# Patient Record
Sex: FEMALE | Race: WHITE | HISPANIC OR LATINO | Employment: PART TIME | URBAN - METROPOLITAN AREA
[De-identification: names, ages, dates, MRNs, and addresses within clinical notes are randomized per-mention and may not be internally consistent; named-entity substitution may affect disease eponyms.]

---

## 2019-03-29 ENCOUNTER — OFFICE VISIT (OUTPATIENT)
Dept: URGENT CARE | Facility: CLINIC | Age: 54
End: 2019-03-29

## 2019-03-29 VITALS
TEMPERATURE: 99 F | DIASTOLIC BLOOD PRESSURE: 90 MMHG | BODY MASS INDEX: 37.28 KG/M2 | RESPIRATION RATE: 20 BRPM | OXYGEN SATURATION: 98 % | SYSTOLIC BLOOD PRESSURE: 135 MMHG | HEART RATE: 98 BPM | WEIGHT: 232 LBS | HEIGHT: 66 IN

## 2019-03-29 DIAGNOSIS — S06.0X0A CONCUSSION WITHOUT LOSS OF CONSCIOUSNESS, INITIAL ENCOUNTER: Primary | ICD-10-CM
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
## 2019-03-29 DIAGNOSIS — S00.91XA ABRASION OF HEAD, INITIAL ENCOUNTER: ICD-10-CM

## 2019-03-29 DIAGNOSIS — S09.90XA TRAUMATIC INJURY OF HEAD, INITIAL ENCOUNTER: ICD-10-CM

## 2019-03-29 DIAGNOSIS — S00.93XA CONTUSION OF HEAD, UNSPECIFIED PART OF HEAD, INITIAL ENCOUNTER: ICD-10-CM

## 2019-03-29 DIAGNOSIS — G44.319 ACUTE POST-TRAUMATIC HEADACHE, NOT INTRACTABLE: ICD-10-CM

## 2019-03-29 PROCEDURE — 99204 OFFICE O/P NEW MOD 45 MIN: CPT | Mod: S$GLB,,, | Performed by: NURSE PRACTITIONER

## 2019-03-29 PROCEDURE — 99204 PR OFFICE/OUTPT VISIT, NEW, LEVL IV, 45-59 MIN: ICD-10-PCS | Mod: S$GLB,,, | Performed by: NURSE PRACTITIONER

## 2019-03-29 RX ORDER — ACETAMINOPHEN 500 MG
1000 TABLET ORAL EVERY 6 HOURS PRN
COMMUNITY
Start: 2019-03-29

## 2019-03-29 RX ORDER — LOSARTAN POTASSIUM 100 MG/1
TABLET ORAL
Refills: 0 | COMMUNITY
Start: 2019-03-27

## 2019-03-29 RX ORDER — LOSARTAN POTASSIUM 100 MG/1
TABLET ORAL
COMMUNITY
Start: 2018-07-27

## 2019-03-29 RX ORDER — GLYBURIDE 5 MG/1
TABLET ORAL
Refills: 1 | COMMUNITY
Start: 2019-03-13

## 2019-03-29 RX ORDER — ERGOCALCIFEROL 1.25 MG/1
CAPSULE ORAL
Refills: 2 | COMMUNITY
Start: 2019-02-25

## 2019-03-29 RX ORDER — GLYBURIDE 5 MG/1
TABLET ORAL
COMMUNITY
Start: 2018-07-27

## 2019-03-29 RX ORDER — METFORMIN HYDROCHLORIDE 1000 MG/1
TABLET ORAL
Refills: 0 | COMMUNITY
Start: 2019-02-04

## 2019-03-29 RX ORDER — METFORMIN HYDROCHLORIDE 1000 MG/1
1000 TABLET ORAL
COMMUNITY
Start: 2012-12-09
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
## 2019-03-29 NOTE — PATIENT INSTRUCTIONS
"  Concussion    A concussion can be caused by a direct blow to the head, neck, face, or somewhere else on the body with the force being transmitted to the head. This may cause you to lose consciousness - be "knocked out" - but not always. Depending on the severity of the blow, it will take from a few hours up to a few days to get better. Sometimes symptoms may last a few months or longer. This is called post-concussion syndrome.  At first, you may have a headache, nausea, vomiting, or dizziness. You may also have problems concentrating or remembering things. This is normal.  Symptoms should get better as the hours and days go by. Symptoms that get worse could be a sign of a more serious injury. This might be a bruise or bleeding in the brain. Thats why its important to watch for the warning signs listed below.  Home care  If your injury is mild and there are no serious signs or symptoms, your healthcare provider may recommend that you be monitored at home. If there is evidence that the injury is more serious, you will be monitored in the hospital. Follow these tips to help care for yourself at home:  · After a concussion, your healthcare provider may recommend that a family member or friend monitor you for 12 to 24 hours. They may be told to wake you every few hours during sleep to check for the signs below.  · If your face or scalp swells, apply an ice pack for 20 minutes every 1 to 2 hours. Do this until the swelling starts to go down. You can make an ice pack by putting ice cubes in a plastic bag and wrapping the bag in a towel.  · You may use acetaminophen to control pain, unless another pain medicine was prescribed. Do not use aspirin or ibuprofen after a head injury. If you have chronic liver or kidney disease, talk with your doctor before using these medicines. Also talk with your doctor if you ever had a stomach ulcer or gastrointestinal bleeding.  · For the next 24 hours:  ¨ Dont drink alcohol or take " sedatives or medicines that make you sleepy.  ¨ Dont drive or operate machinery.  ¨ Avoid doing anything strenuous. Dont lift or strain.  · Dont return to sports or any activity that could cause you to hit your head until all symptoms are gone and you have been cleared by your doctor. A second head injury before fully recovering from the first one can lead to serious brain injury.  · Avoid doing activities that require a lot of concentration or a lot of attention. This will allow your brain to rest and heal quicker.  Follow-up care  Follow up with your doctor in 1 week, or as directed.  Note: A radiologist will review any X-rays or CT scans that were taken. You will be told of any new findings that may affect your care.  When to seek medical advice  Call your healthcare provider right away if any of these occur:  · Repeated vomiting  · Headache or dizziness that is severe or gets worse  · Loss of consciousness  · Unusual drowsiness, or unable to wake up as usual  · Weakness or decreased ability to walk or move any limb  · Confusion, agitation, or change in behavior or speech, or memory loss  · Blurred vision  · Convulsion (seizure)  · Swelling on the scalp or face that gets worse  · Changes in pupil size (the black part of the eye)  · Redness, warmth, or pus from the swollen area  · Fluid draining from or bleeding from the nose or ears     Date Last Reviewed: 8/14/2015  © 3855-7868 [employer]. [address] [place] [address]. All rights reserved. This information is not intended as a substitute for professional medical care. Always follow your healthcare professional's instructions.

## 2019-03-29 NOTE — LETTER
[facility] [place]  [address], [address]  [place] [address]  Phone: [phone]  Fax: [phone]  [facility]: [id number]    Pt Name: Andrew Warren  Injury Date: 03/29/2019   Employee ID: [id number] Date of First Treatment: 03/29/2019   Company: [employer]      Appointment Time: Arrived: 1:32 PM   Provider: Leighann Yeager NP Time Out:  3:40 PM     Office Treatment:   1. Concussion without loss of consciousness, initial encounter    2. Traumatic injury of head, initial encounter    3. Acute post-traumatic headache, not intractable    4. Work related injury    5. Abrasion of head, initial encounter    6. Contusion of head, unspecified part of head, initial encounter      Medications Ordered This Encounter   Medications    acetaminophen (TYLENOL EXTRA STRENGTH) 500 MG tablet      Patient Instructions: (ice to head 10 - 15 minutes several times a day.)    Restrictions: Disabled until next office visit     Return Appointment: 4/1/2019 at 4:30 PM

## 2019-04-01 ENCOUNTER — OFFICE VISIT (OUTPATIENT)
Dept: URGENT CARE | Facility: CLINIC | Age: 54
End: 2019-04-01

## 2019-04-01 DIAGNOSIS — G44.319 ACUTE POST-TRAUMATIC HEADACHE, NOT INTRACTABLE: ICD-10-CM

## 2019-04-01 DIAGNOSIS — S06.0X0D CONCUSSION WITHOUT LOSS OF CONSCIOUSNESS, SUBSEQUENT ENCOUNTER: Primary | ICD-10-CM

## 2019-04-01 DIAGNOSIS — S09.90XD TRAUMATIC INJURY OF HEAD, SUBSEQUENT ENCOUNTER: ICD-10-CM

## 2019-04-01 PROCEDURE — 99214 OFFICE O/P EST MOD 30 MIN: CPT | Mod: S$GLB,,, | Performed by: PREVENTIVE MEDICINE

## 2019-04-01 PROCEDURE — 99214 PR OFFICE/OUTPT VISIT, EST, LEVL IV, 30-39 MIN: ICD-10-PCS | Mod: S$GLB,,, | Performed by: PREVENTIVE MEDICINE

## 2019-04-01 RX ORDER — TRAMADOL HYDROCHLORIDE 50 MG/1
50 TABLET ORAL EVERY 8 HOURS PRN
Qty: 30 TABLET | Refills: 0 | Status: SHIPPED | OUTPATIENT
Start: 2019-04-01 | End: 2019-04-11

## 2019-04-01 NOTE — PROGRESS NOTES
Subjective:       Patient ID: Andrew Warren is a 53 y.o. female.    Chief Complaint: Head Injury (3/21/19)    Patient follows up for head injury from 3/29/19. Patient c/o throbbing pain to temporals when she lays her head on the pillow at bed time, tylenol helps some but not sleeping well and the lump in the back of head still hurts. Ambulatory. MJB    Head Injury    The incident occurred 2 days ago. The injury mechanism was a direct blow. There was no loss of consciousness. The volume of blood lost was minimal. The quality of the pain is described as throbbing. The pain is at a severity of 8/10. The pain is moderate. Associated symptoms include blurred vision and headaches. Pertinent negatives include no vomiting. She has tried acetaminophen for the symptoms. The treatment provided mild relief.     Review of Systems   Constitution: Negative for chills and fever.   HENT: Negative for sore throat.    Eyes: Positive for blurred vision.   Cardiovascular: Negative for chest pain.   Respiratory: Negative for shortness of breath.    Skin: Negative for rash.   Musculoskeletal: Negative for back pain and joint pain.   Gastrointestinal: Negative for abdominal pain, diarrhea, nausea and vomiting.   Neurological: Positive for headaches.   Psychiatric/Behavioral: The patient is not nervous/anxious.        Objective:      Physical Exam   Constitutional: She is oriented to person, place, and time. She appears well-developed and well-nourished.   HENT:   Head: Normocephalic and atraumatic.   Eyes: Pupils are equal, round, and reactive to light. EOM are normal.   Neck: Normal range of motion. Neck supple.   Cardiovascular: Normal rate, regular rhythm and normal heart sounds.   Pulmonary/Chest: Effort normal and breath sounds normal.   Musculoskeletal: Normal range of motion.        Lumbar back: She exhibits no bony tenderness, no swelling, no edema, no deformity, no laceration, no spasm and normal pulse.   Neurological: She is  alert and oriented to person, place, and time. She displays normal reflexes. No cranial nerve deficit or sensory deficit. She exhibits normal muscle tone. She displays a negative Romberg sign. Coordination normal. She displays no Babinski's sign on the right side. She displays no Babinski's sign on the left side.   Reflex Scores:       Bicep reflexes are 1+ on the right side and 1+ on the left side.       Patellar reflexes are 1+ on the right side and 1+ on the left side.       Achilles reflexes are 1+ on the right side and 1+ on the left side.  No focal neurologic deficits   Skin: Skin is warm.   Psychiatric: She has a normal mood and affect.   Nursing note and vitals reviewed.      Assessment:       1. Concussion without loss of consciousness, subsequent encounter    2. Traumatic injury of head, subsequent encounter    3. Acute post-traumatic headache, not intractable        Plan:         Medications Ordered This Encounter   Medications    traMADol (ULTRAM) 50 mg tablet     Sig: Take 1 tablet (50 mg total) by mouth every 8 (eight) hours as needed for Pain.     Dispense:  30 tablet     Refill:  0     Patient Instructions: (CT of head scheduled. )   Restrictions: Disabled until next office visit  Follow up in about 1 week (around 4/8/2019).

## 2019-04-01 NOTE — LETTER
[facility] [place]  [address], [address]  [facility] [address]  Phone: [phone]  Fax: [phone]  [facility]: [id number]    Pt Name: Andrew Warren  Injury Date: 03/29/2019   Employee ID: [id number] Date of Treatment: 04/01/2019   Company: [employer]      Appointment Time: 04:30 PM Arrived: 4:40 PM   Provider: Osiel Khan MD Time Out: 5:38 PM     Office Treatment:   1. Concussion without loss of consciousness, subsequent encounter    2. Traumatic injury of head, subsequent encounter    3. Acute post-traumatic headache, not intractable      Medications Ordered This Encounter   Medications    traMADol (ULTRAM) 50 mg tablet      Patient Instructions: (CT of head scheduled. )    Restrictions: Disabled until next office visit     Return Appointment: 4/8/2019 at 1:00 PM       KD

## 2019-04-08 ENCOUNTER — OFFICE VISIT (OUTPATIENT)
Dept: URGENT CARE | Facility: CLINIC | Age: 54
End: 2019-04-08

## 2019-04-08 DIAGNOSIS — S06.0X0D CONCUSSION WITHOUT LOSS OF CONSCIOUSNESS, SUBSEQUENT ENCOUNTER: Primary | ICD-10-CM

## 2019-04-08 DIAGNOSIS — S09.90XD TRAUMATIC INJURY OF HEAD, SUBSEQUENT ENCOUNTER: ICD-10-CM

## 2019-04-08 DIAGNOSIS — G44.319 ACUTE POST-TRAUMATIC HEADACHE, NOT INTRACTABLE: ICD-10-CM

## 2019-04-08 DIAGNOSIS — M54.2 NECK PAIN: ICD-10-CM

## 2019-04-08 PROCEDURE — 99214 OFFICE O/P EST MOD 30 MIN: CPT | Mod: S$GLB,,, | Performed by: PREVENTIVE MEDICINE

## 2019-04-08 PROCEDURE — 99214 PR OFFICE/OUTPT VISIT, EST, LEVL IV, 30-39 MIN: ICD-10-PCS | Mod: S$GLB,,, | Performed by: PREVENTIVE MEDICINE

## 2019-04-08 NOTE — PROGRESS NOTES
Subjective:       Patient ID: Andrew Warren is a 53 y.o. female.    Chief Complaint: Head Injury    WC Follow-up of Head INJ ( DOI 03-21-19 ) Pain score today is 6-7/10 with constant throbbing pain and sometimes her head feels very hot, light headedness and dizziness. Taking Tramadol 50mg. JI    Head Injury    Associated symptoms include headaches.     Review of Systems   Musculoskeletal: Positive for myalgias.   Gastrointestinal: Positive for nausea.   Neurological: Positive for difficulty with concentration, dizziness, headaches and light-headedness.   All other systems reviewed and are negative.      Objective:      Physical Exam   Constitutional: She is oriented to person, place, and time. She appears well-developed and well-nourished.   HENT:   Head: Normocephalic and atraumatic.   Eyes: Pupils are equal, round, and reactive to light. EOM are normal.   Neck: Normal range of motion. Neck supple.   Cardiovascular: Normal rate, regular rhythm and normal heart sounds.   Pulmonary/Chest: Effort normal and breath sounds normal.   Musculoskeletal: Normal range of motion.        Lumbar back: She exhibits no bony tenderness, no swelling, no edema, no deformity, no laceration, no spasm and normal pulse.   Neurological: She is alert and oriented to person, place, and time. She displays normal reflexes. No cranial nerve deficit or sensory deficit. She exhibits normal muscle tone. She displays a negative Romberg sign. Coordination normal. She displays no Babinski's sign on the right side. She displays no Babinski's sign on the left side.   Reflex Scores:       Bicep reflexes are 1+ on the right side and 1+ on the left side.       Patellar reflexes are 1+ on the right side and 1+ on the left side.       Achilles reflexes are 1+ on the right side and 1+ on the left side.  No focal neurologic deficits   Skin: Skin is warm.   Psychiatric: She has a normal mood and affect.   Nursing note and vitals reviewed.      Assessment:        1. Concussion without loss of consciousness, subsequent encounter    2. Traumatic injury of head, subsequent encounter    3. Acute post-traumatic headache, not intractable    4. Neck pain        Plan:            Patient Instructions: Daily home exercises/warm soaks(Await CT of head. Continue Tramadol as needed for headaches.)   Restrictions: Disabled until next office visit  Follow up in about 4 days (around 4/12/2019).

## 2019-04-08 NOTE — LETTER
[facility] [place]  [address], [address]  [facility] [address]  Phone: [phone]  Fax: [phone]  [facility]: [id number]    Pt Name: Andrew Warren  Injury Date: 03/29/2019   Employee ID:[id number] Date of Treatment: 04/08/2019   Company: [employer]      Appointment Time: 1:00 PM Arrived: 12:59 PM   Provider: Osiel Khan MD Time Out: 2:10 PM     Office Treatment:   1. Concussion without loss of consciousness, subsequent encounter    2. Traumatic injury of head, subsequent encounter    3. Acute post-traumatic headache, not intractable    4. Neck pain          Patient Instructions: Daily home exercises/warm soaks(Await CT of head. Continue Tramadol as needed for headaches.)    Restrictions: Disabled until next office visit     Return Appointment: 4/12/2019 at  2:00 PM

## 2019-04-09 ENCOUNTER — TELEPHONE (OUTPATIENT)
Dept: URGENT CARE | Facility: CLINIC | Age: 54
End: 2019-04-09

## 2019-04-09 NOTE — TELEPHONE ENCOUNTER
This patient is not an employee of [email or web address], but a [facility] agency [employer].  I've left a voice message for the patient to call back to obtain correct billing/claim information.

## 2019-04-18 ENCOUNTER — TELEPHONE (OUTPATIENT)
Dept: URGENT CARE | Facility: CLINIC | Age: 54
End: 2019-04-18

## 2024-04-18 ENCOUNTER — TELEPHONE (OUTPATIENT)
Dept: ORTHOPEDICS | Facility: CLINIC | Age: 59
End: 2024-04-18
Payer: MEDICAID

## 2024-04-18 NOTE — TELEPHONE ENCOUNTER
Spoke with pt's son, as requested. Informed pt that there are no new Medicaid slots open at the moment. Gave pt the number to [facility] & [facility].   ----- Message from Paris Soto sent at 4/18/2024 11:30 AM CDT -----  Regarding: urgent appt  Patient son calling because his mom  was seen at [facility] for a broken humerus and was told to schedule a follow up appointment.      Jeovany ( son) @ [phone]